# Patient Record
Sex: FEMALE | Race: BLACK OR AFRICAN AMERICAN | NOT HISPANIC OR LATINO
[De-identification: names, ages, dates, MRNs, and addresses within clinical notes are randomized per-mention and may not be internally consistent; named-entity substitution may affect disease eponyms.]

---
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
## 2022-01-01 ENCOUNTER — TRANSCRIPTION ENCOUNTER (OUTPATIENT)
Age: 22
End: 2022-01-01

## 2022-01-01 ENCOUNTER — EMERGENCY (EMERGENCY)
Facility: HOSPITAL | Age: 22
LOS: 1 days | Discharge: HOME | End: 2022-01-01
Attending: EMERGENCY MEDICINE | Admitting: EMERGENCY MEDICINE
Payer: MEDICAID

## 2022-01-01 VITALS
SYSTOLIC BLOOD PRESSURE: 105 MMHG | OXYGEN SATURATION: 97 % | TEMPERATURE: 98 F | DIASTOLIC BLOOD PRESSURE: 68 MMHG | HEART RATE: 73 BPM | HEIGHT: 63 IN | RESPIRATION RATE: 16 BRPM

## 2022-01-01 DIAGNOSIS — U07.1 COVID-19: ICD-10-CM

## 2022-01-01 DIAGNOSIS — R09.81 NASAL CONGESTION: ICD-10-CM

## 2022-01-01 DIAGNOSIS — Z91.018 ALLERGY TO OTHER FOODS: ICD-10-CM

## 2022-01-01 DIAGNOSIS — Z88.0 ALLERGY STATUS TO PENICILLIN: ICD-10-CM

## 2022-01-01 LAB — SARS-COV-2 RNA SPEC QL NAA+PROBE: DETECTED

## 2022-01-01 PROCEDURE — U0003: CPT

## 2022-01-01 PROCEDURE — U0005: CPT

## 2022-01-01 PROCEDURE — 99283 EMERGENCY DEPT VISIT LOW MDM: CPT

## 2022-01-01 PROCEDURE — 99284 EMERGENCY DEPT VISIT MOD MDM: CPT

## 2022-01-01 NOTE — ED PROVIDER NOTE - OBJECTIVE STATEMENT
Patient is presenting to the Emergency Department with a request to have COVID-19 testing before she goes back to work. Her mother tested positive for Covid 19 and pt has had sxs of "cold" with congestion and "sniffles" X 1 week. Has had 1 prior Covid test in the last few days which has been negative. Is fully vaccinated for Covid 19 X several months. Denies symptoms, including cough, shortness of breath, fevers.

## 2022-01-01 NOTE — ED PROVIDER NOTE - CLINICAL SUMMARY MEDICAL DECISION MAKING FREE TEXT BOX
Patient is presenting to the Emergency Department and requesting a COVID-19 test. Has an unremarkable focused exam and looks well.  Nasopharyngeal PCR has been obtained and patient has been guided on how to obtain their results.  General COVID-19 discharge instructions have been given to the patient.

## 2022-01-01 NOTE — ED PROVIDER NOTE - PATIENT PORTAL LINK FT
You can access the FollowMyHealth Patient Portal offered by [facility] by registering at the following website: [email or web address]. By joining [employer]’s FollowMyHealth portal, you will also be able to view your health information using other applications (apps) compatible with our system.

## 2022-07-08 NOTE — ED ADULT TRIAGE NOTE - HEIGHT IN INCHES
3 Hx of blood clots lungs and right leg,sob and right under rib cage pain similar symptoms with covid last year,headache since last night and worst this morning.

## 2022-10-13 NOTE — ED ADULT NURSE NOTE - TEMPLATE
Problem: Adult Inpatient Plan of Care  Goal: Plan of Care Review  Outcome: Ongoing, Progressing  Goal: Patient-Specific Goal (Individualized)  Outcome: Ongoing, Progressing  Goal: Optimal Comfort and Wellbeing  Outcome: Ongoing, Progressing     Problem: Skin Injury Risk Increased  Goal: Skin Health and Integrity  Outcome: Ongoing, Progressing     Problem: Fall Injury Risk  Goal: Absence of Fall and Fall-Related Injury  Outcome: Ongoing, Progressing     Problem: Diabetes Comorbidity  Goal: Blood Glucose Level Within Targeted Range  Outcome: Ongoing, Progressing     Problem: Infection  Goal: Absence of Infection Signs and Symptoms  Outcome: Ongoing, Progressing     Problem: Impaired Wound Healing  Goal: Optimal Wound Healing  Outcome: Ongoing, Progressing     Problem: Hemodynamic Instability (Hemodialysis)  Goal: Effective Tissue Perfusion  Outcome: Ongoing, Progressing      Respiratory

## 2023-02-21 NOTE — ED ADULT TRIAGE NOTE - HEART RATE (BEATS/MIN)
88 Bcc  Nodular Histology Text: Emanating from the epidermis and infiltrating the dermis are irregularly shaped islands of basaloid keratinocytes. The cells have scant cytoplasm and round dark nuclei. Mitotic figures and apoptotic bodies are evident. The nuclei at the periphery of the islands have a palisaded arrangement. The islands are associated with a fibromyxoid stroma and there is cleft formation between some of the islands and stroma.

## 2023-08-04 NOTE — ED ADULT NURSE NOTE - ED CARDIAC CAPILLARY REFILL
It was a pleasure taking care of you in our Emergency Department today. We know that when you come to [facility], you are entrusting us with your health, comfort, and safety. Our physicians and nurses honor that trust, and truly appreciate the opportunity to care for you and your loved ones. We also value your feedback. If you receive a survey about your Emergency Department experience today, please fill it out. We care about our patients' feedback, and we listen to what you have to say. Thank you!       Dr. Amadeo Tong MD.
2 seconds or less

## 2025-01-03 ENCOUNTER — APPOINTMENT (OUTPATIENT)
Dept: ULTRASOUND IMAGING | Facility: CLINIC | Age: 25
End: 2025-01-03
Payer: MEDICAID

## 2025-01-03 ENCOUNTER — OUTPATIENT (OUTPATIENT)
Dept: OUTPATIENT SERVICES | Facility: HOSPITAL | Age: 25
LOS: 1 days | End: 2025-01-03

## 2025-01-03 PROCEDURE — 76830 TRANSVAGINAL US NON-OB: CPT | Mod: 26

## 2025-02-17 ENCOUNTER — APPOINTMENT (OUTPATIENT)
Dept: ULTRASOUND IMAGING | Facility: CLINIC | Age: 25
End: 2025-02-17
Payer: MEDICAID

## 2025-02-17 PROCEDURE — 76641 ULTRASOUND BREAST COMPLETE: CPT | Mod: 50

## 2025-04-21 NOTE — PATIENT PROFILE OB - FUNCTIONAL SCREEN CURRENT LEVEL: TOILETING, MLM
The patient is Stable - Low risk of patient condition declining or worsening    Shift Goals  Clinical Goals: Patient will maintain SpO2 at or above 88% on room air, remain free from falls, and monitor urine output during shift  Patient Goals: Rest  Family Goals: MARTIN    Progress made toward(s) clinical / shift goals: Patient maintaining SpO2 at or above 90% on room air. No falls sustained. Urine output documented.    Patient is not progressing towards the following goals: N/A       0 = independent

## 2025-07-11 NOTE — ED PROVIDER NOTE - CLINICAL SUMMARY MEDICAL DECISION MAKING FREE TEXT BOX
Patient is presenting to the Emergency Department and requesting a COVID-19 test.  Patient denies any symptoms, has an unremarkable focused exam and looks well.  Nasopharyngeal PCR has been obtained and patient has been guided on how to obtain their results.  General COVID-19 discharge instructions have been given to the patient.
none